# Patient Record
Sex: FEMALE | Race: WHITE | NOT HISPANIC OR LATINO | Employment: UNEMPLOYED | URBAN - METROPOLITAN AREA
[De-identification: names, ages, dates, MRNs, and addresses within clinical notes are randomized per-mention and may not be internally consistent; named-entity substitution may affect disease eponyms.]

---

## 2017-06-07 RX ORDER — OMEPRAZOLE 20 MG/1
1 CAPSULE, DELAYED RELEASE ORAL DAILY
COMMUNITY
End: 2017-06-07 | Stop reason: DRUGHIGH

## 2017-06-07 RX ORDER — OMEPRAZOLE 40 MG/1
40 CAPSULE, DELAYED RELEASE ORAL DAILY
COMMUNITY
End: 2017-06-08 | Stop reason: SDUPTHER

## 2017-06-08 ENCOUNTER — OFFICE VISIT (OUTPATIENT)
Dept: FAMILY MEDICINE | Facility: CLINIC | Age: 47
End: 2017-06-08
Payer: COMMERCIAL

## 2017-06-08 VITALS
WEIGHT: 137 LBS | DIASTOLIC BLOOD PRESSURE: 62 MMHG | HEART RATE: 79 BPM | BODY MASS INDEX: 23.39 KG/M2 | OXYGEN SATURATION: 99 % | HEIGHT: 64 IN | SYSTOLIC BLOOD PRESSURE: 116 MMHG

## 2017-06-08 DIAGNOSIS — K31.89 EROSIVE GASTROPATHY: ICD-10-CM

## 2017-06-08 DIAGNOSIS — B37.81 CANDIDAL ESOPHAGITIS: ICD-10-CM

## 2017-06-08 DIAGNOSIS — R10.9 ACUTE ABDOMINAL PAIN: Primary | ICD-10-CM

## 2017-06-08 PROBLEM — Z86.39 HISTORY OF THYROID DISORDER: Status: ACTIVE | Noted: 2017-06-08

## 2017-06-08 PROBLEM — R31.9 BLOOD IN THE URINE: Status: ACTIVE | Noted: 2017-06-08

## 2017-06-08 PROBLEM — Z86.39 HISTORY OF THYROID DISORDER: Status: RESOLVED | Noted: 2017-06-08 | Resolved: 2017-06-08

## 2017-06-08 PROBLEM — K21.00 GASTROESOPHAGEAL REFLUX DISEASE WITH ESOPHAGITIS: Status: ACTIVE | Noted: 2017-06-08

## 2017-06-08 PROBLEM — D50.9 IRON DEFICIENCY ANEMIA: Status: RESOLVED | Noted: 2017-06-08 | Resolved: 2017-06-08

## 2017-06-08 PROBLEM — D50.9 IRON DEFICIENCY ANEMIA: Status: ACTIVE | Noted: 2017-06-08

## 2017-06-08 PROBLEM — R10.2 PAIN IN FEMALE PELVIS: Status: ACTIVE | Noted: 2017-06-08

## 2017-06-08 PROBLEM — E89.0 POSTOPERATIVE HYPOTHYROIDISM: Status: ACTIVE | Noted: 2017-06-08

## 2017-06-08 PROCEDURE — 99214 OFFICE O/P EST MOD 30 MIN: CPT | Mod: ,,, | Performed by: FAMILY MEDICINE

## 2017-06-08 RX ORDER — OMEPRAZOLE 40 MG/1
40 CAPSULE, DELAYED RELEASE ORAL DAILY
Qty: 90 CAPSULE | Refills: 1 | Status: SHIPPED | OUTPATIENT
Start: 2017-06-08 | End: 2018-07-11

## 2017-06-08 RX ORDER — FLUCONAZOLE 100 MG/1
100 TABLET ORAL DAILY
Qty: 7 TABLET | Refills: 0 | Status: SHIPPED | OUTPATIENT
Start: 2017-06-08 | End: 2017-06-15

## 2017-06-08 NOTE — PROGRESS NOTES
Subjective:       Patient ID:  Lizbeth Pathak is a 46 y.o. female with multiple medical diagnoses as listed in the medical history and problem list that presents for Abdominal Pain (f/u)  .      Chief Complaint: Abdominal Pain (f/u)    Abdominal Pain   This is a chronic problem. The current episode started more than 1 month ago. The problem occurs intermittently. The problem has been gradually improving. The pain is located in the epigastric region. The pain is at a severity of 4/10. The pain is moderate. The quality of the pain is dull and cramping. The abdominal pain does not radiate. Pertinent negatives include no fever, nausea or vomiting. Arthralgias: right keen and back pain.  The pain is aggravated by eating. She has tried proton pump inhibitors for the symptoms. The treatment provided mild relief. Prior diagnostic workup includes GI consult, upper endoscopy and lower endoscopy. Her past medical history is significant for GERD.   Gastroesophageal Reflux   She complains of abdominal pain. She reports no chest pain or no nausea. This is a chronic problem. The current episode started more than 1 month ago. The problem occurs constantly. The problem has been gradually improving. The symptoms are aggravated by certain foods. Pertinent negatives include no anemia or muscle weakness. She has tried a PPI for the symptoms. The treatment provided mild relief. Past procedures include an EGD and a UGI.     Review of Systems   Constitutional: Negative for activity change, appetite change and fever.   HENT: Negative.  Negative for facial swelling.    Eyes: Negative.  Negative for discharge.   Respiratory: Negative for apnea.    Cardiovascular: Negative.  Negative for chest pain.   Gastrointestinal: Positive for abdominal pain. Negative for abdominal distention, nausea and vomiting.   Genitourinary: Negative for difficulty urinating and dyspareunia.   Musculoskeletal: Negative for muscle weakness. Arthralgias: right keen and  back pain.    Neurological: Negative.  Negative for dizziness.   Psychiatric/Behavioral: Negative for agitation.       Past Medical History:   Diagnosis Date    Anemia     GERD (gastroesophageal reflux disease)     Hypothyroidism     Thyroid disease     thyroid nodule     Past Surgical History:   Procedure Laterality Date    THYROID SURGERY      THYROIDECTOMY  1982     No family history on file.  Social History     Social History    Marital status:      Spouse name: N/A    Number of children: N/A    Years of education: N/A     Social History Main Topics    Smoking status: Never Smoker    Smokeless tobacco: None    Alcohol use Yes      Comment: occasional    Drug use: No    Sexual activity: Not Asked     Other Topics Concern    None     Social History Narrative    None     Current Outpatient Prescriptions   Medication Sig Dispense Refill    omeprazole (PRILOSEC) 40 MG capsule Take 1 capsule (40 mg total) by mouth once daily. 90 capsule 1    fluconazole (DIFLUCAN) 100 MG tablet Take 1 tablet (100 mg total) by mouth once daily. 7 tablet 0     No current facility-administered medications for this visit.      Review of patient's allergies indicates:  No Known Allergies  Objective:      Vitals:    06/08/17 1001   BP: 116/62   Pulse: 79     Physical Exam   Constitutional: She appears well-developed and well-nourished.   HENT:   Head: Normocephalic.   Eyes: Pupils are equal, round, and reactive to light. Right eye exhibits no discharge. Left eye exhibits no discharge. No scleral icterus.   Neck: No thyromegaly present.   Cardiovascular: Normal rate and regular rhythm.  Exam reveals no gallop and no friction rub.    No murmur heard.  Pulmonary/Chest: No respiratory distress. She has no wheezes. She has no rales. She exhibits no tenderness.   Abdominal: She exhibits no distension. There is tenderness in the epigastric area.   Musculoskeletal: She exhibits no tenderness.   Skin: No erythema.    Psychiatric: She has a normal mood and affect.       Assessment:       1. Acute abdominal pain    2. Erosive gastropathy    3. Candidal esophagitis        Plan:       Acute abdominal pain  Comments:  2/2 erosive gastritis    Erosive gastropathy  Comments:  mild improving. continue on PPI, consider redo-EGD / u/s if worsening  Orders:  -     omeprazole (PRILOSEC) 40 MG capsule; Take 1 capsule (40 mg total) by mouth once daily.  Dispense: 90 capsule; Refill: 1    Candidal esophagitis  Comments:  retreat.   Orders:  -     fluconazole (DIFLUCAN) 100 MG tablet; Take 1 tablet (100 mg total) by mouth once daily.  Dispense: 7 tablet; Refill: 0      Return in about 3 months (around 9/8/2017) for gerd .      6/8/2017 Morenita Duff M.D.

## 2017-06-08 NOTE — PATIENT INSTRUCTIONS
Tips to Control Acid Reflux    To control acid reflux, youll need to make some basic diet and lifestyle changes. The simple steps outlined below may be all youll need to ease discomfort.  Watch what you eat  · Avoid fatty foods and spicy foods.  · Eat fewer acidic foods, such as citrus and tomato-based foods. These can increase symptoms.  · Limit drinking alcohol, caffeine, and fizzy beverages. All increase acid reflux.  · Try limiting chocolate, peppermint, and spearmint. These can worsen acid reflux in some people.  Watch when you eat  · Avoid lying down for 3 hours after eating.  · Do not snack before going to bed.  Raise your head  Raising your head and upper body by 4 to 6 inches helps limit reflux when youre lying down. Put blocks under the head of your bed frame to raise it.  Other changes  · Lose weight, if you need to  · Dont exercise near bedtime  · Avoid tight-fitting clothes  · Limit aspirin and ibuprofen  · Stop smoking   Date Last Reviewed: 7/1/2016 © 2000-2016 [employer]. [address] [place] [address]. All rights reserved. This information is not intended as a substitute for professional medical care. Always follow your healthcare professional's instructions.        Medicines for Acid Reflux  Your healthcare provider has told you that you have acid reflux. This condition causes stomach acid to wash up into your throat. For most people, acid reflux is troubling but not dangerous. But left untreated, acid reflux sometimes damages the esophagus. Medicines can help control acid reflux and limit your risk of future problems.  Medicines for acid reflux  Your healthcare provider may prescribe medicine to help treat your acid reflux. Medicine will be based on your symptoms and any test results. Your provider will explain how to take your medicine. You will also be told about possible side effects.  Reducing stomach acid  Your provider may suggest antacids that you can buy  over the counter. Antacids can give fast relief. Or you may be told to take a type of medicine called H2 blockers. These are available over the counter and by prescription (for higher doses).  Blocking stomach acid  In more severe cases, your healthcare provider may suggest stronger medicines such as proton pump inhibitors (PPIs). These keep the stomach from making acid. They are often prescribed for long-term use.  Other medicines  In some cases medicines to reduce or block stomach acid may not work. Then you may be switched to another type of medicine that helps your stomach empty better.     Date Last Reviewed: 10/1/2016  © 3541-6239 [employer]. [address] [place] [address], [place] [address]. All rights reserved. This information is not intended as a substitute for professional medical care. Always follow your healthcare professional's instructions.

## 2018-07-11 PROBLEM — R31.9 BLOOD IN THE URINE: Status: RESOLVED | Noted: 2017-06-08 | Resolved: 2018-07-11

## 2018-07-11 PROBLEM — R10.2 PAIN IN FEMALE PELVIS: Status: RESOLVED | Noted: 2017-06-08 | Resolved: 2018-07-11

## 2018-07-11 PROBLEM — R10.9 ACUTE ABDOMINAL PAIN: Chronic | Status: RESOLVED | Noted: 2017-06-08 | Resolved: 2018-07-11

## 2019-09-11 PROBLEM — K31.89 EROSIVE GASTROPATHY: Status: RESOLVED | Noted: 2017-06-08 | Resolved: 2019-09-11

## 2020-06-09 DIAGNOSIS — Z12.31 ENCOUNTER FOR SCREENING MAMMOGRAM FOR MALIGNANT NEOPLASM OF BREAST: Primary | ICD-10-CM

## 2020-06-16 ENCOUNTER — HOSPITAL ENCOUNTER (OUTPATIENT)
Dept: RADIOLOGY | Facility: HOSPITAL | Age: 50
Discharge: HOME | End: 2020-06-16
Attending: OBSTETRICS & GYNECOLOGY
Payer: COMMERCIAL

## 2020-06-16 VITALS — HEIGHT: 64 IN | WEIGHT: 148.38 LBS | BODY MASS INDEX: 25.33 KG/M2

## 2020-06-16 DIAGNOSIS — Z12.31 ENCOUNTER FOR SCREENING MAMMOGRAM FOR MALIGNANT NEOPLASM OF BREAST: ICD-10-CM

## 2020-06-16 PROCEDURE — 77067 SCR MAMMO BI INCL CAD: CPT | Mod: TC,PO

## 2020-09-17 PROBLEM — K21.00 GASTROESOPHAGEAL REFLUX DISEASE WITH ESOPHAGITIS: Status: RESOLVED | Noted: 2017-06-08 | Resolved: 2020-09-17

## 2021-04-29 ENCOUNTER — PATIENT MESSAGE (OUTPATIENT)
Dept: RESEARCH | Facility: HOSPITAL | Age: 51
End: 2021-04-29

## 2021-06-30 DIAGNOSIS — Z12.31 ENCOUNTER FOR SCREENING MAMMOGRAM FOR MALIGNANT NEOPLASM OF BREAST: Primary | ICD-10-CM

## 2021-07-14 ENCOUNTER — HOSPITAL ENCOUNTER (OUTPATIENT)
Dept: RADIOLOGY | Facility: HOSPITAL | Age: 51
Discharge: HOME | End: 2021-07-14
Attending: OBSTETRICS & GYNECOLOGY
Payer: COMMERCIAL

## 2021-07-14 DIAGNOSIS — Z12.31 ENCOUNTER FOR SCREENING MAMMOGRAM FOR MALIGNANT NEOPLASM OF BREAST: ICD-10-CM

## 2021-07-14 PROCEDURE — 77067 SCR MAMMO BI INCL CAD: CPT | Mod: TC,PO

## 2022-07-18 DIAGNOSIS — Z12.31 ENCOUNTER FOR SCREENING MAMMOGRAM FOR MALIGNANT NEOPLASM OF BREAST: Primary | ICD-10-CM

## 2022-08-15 ENCOUNTER — HOSPITAL ENCOUNTER (OUTPATIENT)
Dept: RADIOLOGY | Facility: HOSPITAL | Age: 52
Discharge: HOME | End: 2022-08-15
Attending: OBSTETRICS & GYNECOLOGY
Payer: COMMERCIAL

## 2022-08-15 VITALS — WEIGHT: 151.88 LBS | BODY MASS INDEX: 25.93 KG/M2 | HEIGHT: 64 IN

## 2022-08-15 DIAGNOSIS — Z12.31 ENCOUNTER FOR SCREENING MAMMOGRAM FOR MALIGNANT NEOPLASM OF BREAST: ICD-10-CM

## 2022-08-15 PROCEDURE — 77067 SCR MAMMO BI INCL CAD: CPT | Mod: TC,PO

## 2022-08-15 PROCEDURE — 77063 BREAST TOMOSYNTHESIS BI: CPT | Mod: TC,PO

## 2023-06-11 ENCOUNTER — HOSPITAL ENCOUNTER (EMERGENCY)
Facility: HOSPITAL | Age: 53
Discharge: HOME | End: 2023-06-11
Attending: EMERGENCY MEDICINE
Payer: COMMERCIAL

## 2023-06-11 VITALS
SYSTOLIC BLOOD PRESSURE: 131 MMHG | TEMPERATURE: 98 F | OXYGEN SATURATION: 99 % | RESPIRATION RATE: 18 BRPM | DIASTOLIC BLOOD PRESSURE: 83 MMHG | HEART RATE: 68 BPM

## 2023-06-11 DIAGNOSIS — R07.9 CHEST PAIN: ICD-10-CM

## 2023-06-11 LAB
ALBUMIN SERPL BCP-MCNC: 4.6 G/DL (ref 3.5–5.2)
ALP SERPL-CCNC: 84 U/L (ref 55–135)
ALT SERPL W/O P-5'-P-CCNC: 21 U/L (ref 10–44)
ANION GAP SERPL CALC-SCNC: 14 MMOL/L (ref 8–16)
AST SERPL-CCNC: 16 U/L (ref 10–40)
BASOPHILS # BLD AUTO: 0.04 K/UL (ref 0–0.2)
BASOPHILS NFR BLD: 0.7 % (ref 0–1.9)
BILIRUB SERPL-MCNC: 0.7 MG/DL (ref 0.1–1)
BNP SERPL-MCNC: 21 PG/ML (ref 0–99)
BUN SERPL-MCNC: 9 MG/DL (ref 6–20)
CALCIUM SERPL-MCNC: 10.1 MG/DL (ref 8.7–10.5)
CHLORIDE SERPL-SCNC: 99 MMOL/L (ref 95–110)
CO2 SERPL-SCNC: 23 MMOL/L (ref 23–29)
CREAT SERPL-MCNC: 0.8 MG/DL (ref 0.5–1.4)
D DIMER PPP IA.FEU-MCNC: 0.27 MG/L FEU
DIFFERENTIAL METHOD: ABNORMAL
EOSINOPHIL # BLD AUTO: 0.1 K/UL (ref 0–0.5)
EOSINOPHIL NFR BLD: 0.8 % (ref 0–8)
ERYTHROCYTE [DISTWIDTH] IN BLOOD BY AUTOMATED COUNT: 12.5 % (ref 11.5–14.5)
EST. GFR  (NO RACE VARIABLE): >60 ML/MIN/1.73 M^2
GLUCOSE SERPL-MCNC: 96 MG/DL (ref 70–110)
HCT VFR BLD AUTO: 40.3 % (ref 37–48.5)
HGB BLD-MCNC: 13.3 G/DL (ref 12–16)
IMM GRANULOCYTES # BLD AUTO: 0.04 K/UL (ref 0–0.04)
IMM GRANULOCYTES NFR BLD AUTO: 0.7 % (ref 0–0.5)
LIPASE SERPL-CCNC: 24 U/L (ref 4–60)
LYMPHOCYTES # BLD AUTO: 1.7 K/UL (ref 1–4.8)
LYMPHOCYTES NFR BLD: 28.4 % (ref 18–48)
MCH RBC QN AUTO: 28.9 PG (ref 27–31)
MCHC RBC AUTO-ENTMCNC: 33 G/DL (ref 32–36)
MCV RBC AUTO: 87 FL (ref 82–98)
MONOCYTES # BLD AUTO: 0.3 K/UL (ref 0.3–1)
MONOCYTES NFR BLD: 5 % (ref 4–15)
NEUTROPHILS # BLD AUTO: 3.9 K/UL (ref 1.8–7.7)
NEUTROPHILS NFR BLD: 64.4 % (ref 38–73)
NRBC BLD-RTO: 0 /100 WBC
PLATELET # BLD AUTO: 248 K/UL (ref 150–450)
PMV BLD AUTO: 11.1 FL (ref 9.2–12.9)
POTASSIUM SERPL-SCNC: 3.6 MMOL/L (ref 3.5–5.1)
PROT SERPL-MCNC: 8.2 G/DL (ref 6–8.4)
RBC # BLD AUTO: 4.61 M/UL (ref 4–5.4)
SODIUM SERPL-SCNC: 136 MMOL/L (ref 136–145)
TROPONIN I SERPL DL<=0.01 NG/ML-MCNC: <0.006 NG/ML (ref 0–0.03)
TROPONIN I SERPL DL<=0.01 NG/ML-MCNC: <0.006 NG/ML (ref 0–0.03)
TSH SERPL DL<=0.005 MIU/L-ACNC: 1.41 UIU/ML (ref 0.4–4)
WBC # BLD AUTO: 6.06 K/UL (ref 3.9–12.7)

## 2023-06-11 PROCEDURE — 36415 COLL VENOUS BLD VENIPUNCTURE: CPT | Performed by: NURSE PRACTITIONER

## 2023-06-11 PROCEDURE — 94760 N-INVAS EAR/PLS OXIMETRY 1: CPT

## 2023-06-11 PROCEDURE — 85025 COMPLETE CBC W/AUTO DIFF WBC: CPT | Performed by: NURSE PRACTITIONER

## 2023-06-11 PROCEDURE — 84484 ASSAY OF TROPONIN QUANT: CPT | Mod: 91 | Performed by: NURSE PRACTITIONER

## 2023-06-11 PROCEDURE — 83880 ASSAY OF NATRIURETIC PEPTIDE: CPT | Performed by: NURSE PRACTITIONER

## 2023-06-11 PROCEDURE — 25000242 PHARM REV CODE 250 ALT 637 W/ HCPCS: Performed by: EMERGENCY MEDICINE

## 2023-06-11 PROCEDURE — 83690 ASSAY OF LIPASE: CPT | Performed by: EMERGENCY MEDICINE

## 2023-06-11 PROCEDURE — 99285 EMERGENCY DEPT VISIT HI MDM: CPT | Mod: 25

## 2023-06-11 PROCEDURE — 25000003 PHARM REV CODE 250: Performed by: EMERGENCY MEDICINE

## 2023-06-11 PROCEDURE — 85379 FIBRIN DEGRADATION QUANT: CPT | Performed by: EMERGENCY MEDICINE

## 2023-06-11 PROCEDURE — 93010 EKG 12-LEAD: ICD-10-PCS | Mod: ,,, | Performed by: INTERNAL MEDICINE

## 2023-06-11 PROCEDURE — 93005 ELECTROCARDIOGRAM TRACING: CPT

## 2023-06-11 PROCEDURE — 25000003 PHARM REV CODE 250: Performed by: NURSE PRACTITIONER

## 2023-06-11 PROCEDURE — 93010 ELECTROCARDIOGRAM REPORT: CPT | Mod: ,,, | Performed by: INTERNAL MEDICINE

## 2023-06-11 PROCEDURE — 84443 ASSAY THYROID STIM HORMONE: CPT | Performed by: NURSE PRACTITIONER

## 2023-06-11 PROCEDURE — 80053 COMPREHEN METABOLIC PANEL: CPT | Performed by: NURSE PRACTITIONER

## 2023-06-11 RX ORDER — ACETAMINOPHEN 500 MG
1000 TABLET ORAL
Status: COMPLETED | OUTPATIENT
Start: 2023-06-11 | End: 2023-06-11

## 2023-06-11 RX ORDER — NITROGLYCERIN 0.4 MG/1
0.4 TABLET SUBLINGUAL
Status: COMPLETED | OUTPATIENT
Start: 2023-06-11 | End: 2023-06-11

## 2023-06-11 RX ORDER — ASPIRIN 325 MG
325 TABLET ORAL
Status: COMPLETED | OUTPATIENT
Start: 2023-06-11 | End: 2023-06-11

## 2023-06-11 RX ADMIN — NITROGLYCERIN 0.4 MG: 0.4 TABLET, ORALLY DISINTEGRATING SUBLINGUAL at 01:06

## 2023-06-11 RX ADMIN — ASPIRIN 325 MG: 325 TABLET ORAL at 01:06

## 2023-06-11 RX ADMIN — ACETAMINOPHEN 1000 MG: 500 TABLET ORAL at 03:06

## 2023-06-11 NOTE — FIRST PROVIDER EVALUATION
" Emergency Department TeleTriage Encounter Note      CHIEF COMPLAINT    Chief Complaint   Patient presents with    Chest Pain     Pt states she feels a pressure in her chest.      Dizziness       VITAL SIGNS   Initial Vitals [06/11/23 1237]   BP Pulse Resp Temp SpO2   (!) 212/101 103 (!) 22 97.8 °F (36.6 °C) 99 %      MAP       --            ALLERGIES    Review of patient's allergies indicates:   Allergen Reactions    Nsaids (non-steroidal anti-inflammatory drug) Other (See Comments)     gastritis       PROVIDER TRIAGE NOTE  This is a teletriage evaluation of a 52 y.o. female presenting to the ED complaining of "lightheadedness" starting last night with CP and SOB starting today. No pmhx of HTN. Pt's spouse states that he gave her one lisinopril prior to arrival today due to noted elevated BP at home.  No headache.      Pt is sitting upright, eyes closed.  BP elevated. Comfortable work of breathing.      Attendings Anna and charge RN notified via [employer] of patient's status and BP    Initial orders will be placed and care will be transferred to an alternate provider when patient is roomed for a full evaluation. Any additional orders and the final disposition will be determined by that provider.         ORDERS  Labs Reviewed - No data to display    ED Orders (720h ago, onward)      Start Ordered     Status Ordering Provider    06/11/23 1547 06/11/23 1247  Troponin I #2  Once Timed         Ordered LISA BRAVO N.    06/11/23 1300 06/11/23 1247  aspirin tablet 325 mg  ED 1 Time         Ordered LISA BRAVO N.    06/11/23 1247 06/11/23 1247  Vital signs  Every 15 min         Ordered LISA BRAVO N.    06/11/23 1247 06/11/23 1247  Cardiac Monitoring - Adult  Continuous        Comments: Notify Physician If:    Ordered LISA BRAVO N.    06/11/23 1247 06/11/23 1247  Pulse Oximetry Continuous  Continuous         Ordered LISA BRAVO N.    06/11/23 1247 " 06/11/23 1247  Diet NPO  Diet effective now         Ordered LISA BRAVO N.    06/11/23 1247 06/11/23 1247  Saline lock IV  Once         Ordered LISA BRAVO N.    06/11/23 1247 06/11/23 1247  EKG 12-lead  Once        Comments: Do not perform if previously done during this visit/ triage    Ordered LISA BRAVO N.    06/11/23 1247 06/11/23 1247  CBC auto differential  STAT         Ordered LISA BRAVO N.    06/11/23 1247 06/11/23 1247  Comprehensive metabolic panel  STAT         Ordered LISA BRAVO N.    06/11/23 1247 06/11/23 1247  Troponin I #1  STAT         Ordered LISA BRAVO N.    06/11/23 1247 06/11/23 1247  BNP  STAT         Ordered LISA BRAVO N.    06/11/23 1247 06/11/23 1247  X-Ray Chest AP Portable  1 time imaging         Ordered LISA BRAVO N.    06/11/23 1247 06/11/23 1247  TSH  STAT         Ordered LISA BRAVO              Virtual Visit Note: The provider triage portion of this emergency department evaluation and documentation was performed via [employer], a HIPAA-compliant telemedicine application, in concert with a tele-presenter in the room. A face to face patient evaluation with one of my colleagues will occur once the patient is placed in an emergency department room.      DISCLAIMER: This note was prepared with [employer] voice recognition transcription software. Garbled syntax, mangled pronouns, and other bizarre constructions may be attributed to that software system.

## 2023-06-11 NOTE — ED PROVIDER NOTES
Encounter Date: 6/11/2023    SCRIBE #1 NOTE: I, Gissellegermaine Henry, am scribing for, and in the presence of,  Asim Thomason MD.     History     Chief Complaint   Patient presents with    Chest Pain     Pt states she feels a pressure in her chest.      Dizziness     Time seen by provider: 1:11 PM on 06/11/2023    Lizbeth Pathak is a 52 y.o. female who presents to the ED with an onset of chest pain, abdominal pain, headache, neck pain, back pain, nausea, dizziness, and SOB that began last night and worsened this morning. Patient was given a Lisinopril by her  to help with her blood pressure. The patient denies vomiting, vision changes or any other symptoms at this time. She has a PMHx of smoking, hypothyroidism, anemia. She has a PSHx of thyroidectomy. She has a family Hx of heart attack in a parent. Patient is a smoker.    The history is provided by the patient.   Review of patient's allergies indicates:   Allergen Reactions    Nsaids (non-steroidal anti-inflammatory drug) Other (See Comments)     gastritis     Past Medical History:   Diagnosis Date    Anal fissure 12/17/2012    Anemia     Gastritis due to fungus 2017    treated with Diflucan, Dr Becker    Hypothyroidism     Thyroid disease     thyroid nodule     Past Surgical History:   Procedure Laterality Date    THYROID SURGERY      THYROIDECTOMY  1982     Family History   Problem Relation Age of Onset    Diabetes Mother     Cancer Father      Social History     Tobacco Use    Smoking status: Never    Smokeless tobacco: Never   Substance Use Topics    Alcohol use: Yes     Comment: occasional    Drug use: No     Review of Systems   Eyes:  Negative for visual disturbance.   Respiratory:  Positive for shortness of breath.    Cardiovascular:  Positive for chest pain.   Gastrointestinal:  Positive for abdominal pain and nausea. Negative for vomiting.   Musculoskeletal:  Positive for back pain and neck pain.   Neurological:  Positive for dizziness and headaches.    All other systems reviewed and are negative.    Physical Exam     Initial Vitals [06/11/23 1237]   BP Pulse Resp Temp SpO2   (!) 212/101 103 (!) 22 97.8 °F (36.6 °C) 99 %      MAP       --         Physical Exam    Nursing note and vitals reviewed.  Constitutional: She appears well-developed and well-nourished. She is not diaphoretic.  Non-toxic appearance. She does not have a sickly appearance. She appears ill. She appears distressed (mildly).   HENT:   Head: Normocephalic and atraumatic.   Eyes: EOM are normal.   Neck: Neck supple.   Normal range of motion.  Cardiovascular:  Normal rate, regular rhythm and normal heart sounds.     Exam reveals no gallop and no friction rub.       No murmur heard.  Pulmonary/Chest: Breath sounds normal. No respiratory distress. She has no wheezes. She has no rhonchi. She has no rales.   Abdominal: She exhibits no distension. There is abdominal tenderness in the right upper quadrant. There is no rebound and no guarding.   Musculoskeletal:         General: Normal range of motion.      Cervical back: Normal range of motion and neck supple.     Neurological: She is alert and oriented to person, place, and time.   Skin: Skin is warm and dry.   Psychiatric: She has a normal mood and affect. Her behavior is normal. Judgment and thought content normal.       ED Course   Procedures  Labs Reviewed   CBC W/ AUTO DIFFERENTIAL - Abnormal; Notable for the following components:       Result Value    Immature Granulocytes 0.7 (*)     All other components within normal limits   COMPREHENSIVE METABOLIC PANEL   TROPONIN I   B-TYPE NATRIURETIC PEPTIDE   TSH   LIPASE   D DIMER, QUANTITATIVE   TROPONIN I          Imaging Results              US Abdomen Limited (Final result)  Result time 06/11/23 15:18:39      Final result by Shirin Polanco MD (06/11/23 15:18:39)                   Impression:      No significant sonographic abnormality.      Electronically signed by: Shirin Polanco  MD  Date:    06/11/2023  Time:    15:18               Narrative:    EXAMINATION:  US ABDOMEN LIMITED    CLINICAL HISTORY:  epigastric ruq pain;    TECHNIQUE:  Limited ultrasound of the right upper quadrant of the abdomen (including pancreas, liver, gallbladder, common bile duct, and spleen) was performed.    COMPARISON:  None.    FINDINGS:  Liver: Normal in size, measuring 16 cm. Homogeneous echotexture. No focal hepatic lesions.    Gallbladder: No calculi, wall thickening, or pericholecystic fluid.  No sonographic Mckeon's sign.    Biliary system: The common duct is not dilated, measuring 4.9 mm.  No intrahepatic ductal dilatation.    Spleen: Normal in size and echotexture, measuring 10.1 cm.    Miscellaneous: No upper abdominal ascites.                                       X-Ray Chest AP Portable (Final result)  Result time 06/11/23 13:15:03      Final result by Shirin Polanco MD (06/11/23 13:15:03)                   Impression:      No acute abnormality.      Electronically signed by: Shirin Polanco MD  Date:    06/11/2023  Time:    13:15               Narrative:    EXAMINATION:  XR CHEST AP PORTABLE    CLINICAL HISTORY:  Chest Pain;    TECHNIQUE:  Single frontal view of the chest was performed.    COMPARISON:  None    FINDINGS:  The lungs are clear, with normal appearance of pulmonary vasculature and no pleural effusion or pneumothorax.    The cardiac silhouette is normal in size. The hilar and mediastinal contours are unremarkable.    Bones are intact.                                       Medications   aspirin tablet 325 mg (325 mg Oral Given 6/11/23 1333)   nitroGLYCERIN SL tablet 0.4 mg (0.4 mg Sublingual Given 6/11/23 1345)   acetaminophen tablet 1,000 mg (1,000 mg Oral Given 6/11/23 1521)     Medical Decision Making:   History:   I obtained history from: someone other than patient.       <> Summary of History:   Old Medical Records: I decided to obtain old medical records.  Initial Assessment:    52-year-old female presents the ER with numerous complaints including lightheadedness, dizziness, headache, neck pain, chest pain, shortness of breath and abdominal pain  Differential Diagnosis:   PE, MI, dissection, CVA, electrolyte abnormality  Independently Interpreted Test(s):   I have ordered and independently interpreted X-rays - see prior notes.  I have ordered and independently interpreted EKG Reading(s) - see prior notes  Clinical Tests:   Lab Tests: Ordered and Reviewed  Radiological Study: Ordered and Reviewed  Medical Tests: Ordered and Reviewed  ED Management:  Patient was given aspirin, sublingual nitro, Tylenol and all symptoms resolved by discharge.  Extensive ER workup including D-dimer and serial enzymes are negative.  52-year-old female history of hypothyroidism presents to the emergency room with multiple complaints.  Symptoms began yesterday with headache.  Headache onset was gradual not sudden onset or maximal in onset.  Earlier today also began to have neck pain chest pain shortness of breath and abdominal pain.  Blood pressure markedly elevated at home prior to arrival so presented to the ER.  Symptoms were improving and she was given nitroglycerin and Tylenol which completely resolved her headache and other symptoms.  Two troponins are negative.  Normal D-dimer normal abdominal ultrasound all labs are unremarkable, normal EKG normal chest x-ray asymptomatic at this time.  Unclear etiology for symptoms but perhaps related to her elevated blood pressure.  Follow-up later this week with primary care, return to the ER for any other concerns.  Completely asymptomatic on discharge.  No evidence at all at this time of any life-threatening conditions such as pulmonary embolus, aortic dissection, MI, vertebral or carotid dissection.        Scribe Attestation:   Scribe #1: I performed the above scribed service and the documentation accurately describes the services I performed. I attest to the  accuracy of the note.      ED Course as of 06/11/23 1833   Sun Jun 11, 2023   1308 BP(!): 202/111 [EF]   1308 BP(!): 212/101 [EF]   1308 Temp: 97.8 °F (36.6 °C) [EF]   1308 Temp Source: Oral [EF]   1308 Pulse: 103 [EF]   1308 Resp(!): 22 [EF]   1308 SpO2: 99 % [EF]   1317 X-Ray Chest AP Portable [EF]   1318 Sinus rhythm 88 beats per minute normal axis no ST elevation or depression or T-wave inversion independently interpreted [EF]   1320 52-year-old relatively healthy lady presents to the emergency room with numerous complaints including headache neck pain chest pain shortness of breath abdominal pain.  Very broad differential including PE, MI, dissection [EF]   1320 We will get bilateral upper extremity blood pressures [EF]   1328 BUE BP same [EF]   1336 WBC: 6.06 [EF]   1336 Hemoglobin: 13.3 [EF]   1337 Platelets: 248 [EF]   1404 D-Dimer: 0.27 [EF]   1404 BP: 139/85 [EF]   1411 X-Ray Chest AP Portable [EF]   1426 Troponin I: <0.006 [EF]   1437 Sx improving, some epigastric abd pain, will US GB, mild HA.    I do not suspect a life-threatening cause of the headache such as subarachnoid, meningitis, epidural, subdural, venous thrombus, intracranial hypertension, carotid dissection or vertebral dissection.     [EF]   1437 Feels much better at time time [EF]   1522 US Abdomen Limited [EF]   1550 Continues to do well, HA almost gone [EF]   1635 Troponin I: <0.006 [EF]   1712 Sx resolved, will dc home [EF]   1717 52-year-old female history of hypothyroidism presents to the emergency room with multiple complaints.  Symptoms began yesterday with headache.  Headache onset was gradual not sudden onset or maximal in onset.  Earlier today also began to have neck pain chest pain shortness of breath and abdominal pain.  Blood pressure markedly elevated at home prior to arrival so presented to the ER.  Symptoms were improving and she was given nitroglycerin and Tylenol which completely resolved her headache and other symptoms.   Two troponins are negative.  Normal D-dimer normal abdominal ultrasound all labs are unremarkable, normal EKG normal chest x-ray asymptomatic at this time.  Unclear etiology for symptoms but perhaps related to her elevated blood pressure.  Follow-up later this week with primary care, return to the ER for any other concerns.  Completely asymptomatic on discharge. [EF]      ED Course User Index  [EF] Asim Thomason MD               I, Dr. Thomason, personally performed the services described in this documentation. All medical record entries made by the scribe were at my direction and in my presence.  I have reviewed the chart and agree that the record reflects my personal performance and is accurate and complete.6:33 PM 06/11/2023    Clinical Impression:   Final diagnoses:  [R07.9] Chest pain        ED Disposition Condition    Discharge Stable          ED Prescriptions    None       Follow-up Information       Follow up With Specialties Details Why Contact Info    Lili Garnica MD Hospitalist, Internal Medicine Schedule an appointment as soon as possible for a visit in 1 week  [address]  [facility] [address]  [facility] [address]  [phone]      [facility] Emergency Medicine  As needed, If symptoms worsen [address] [place] [id number]  [phone]             Asim Thomason MD  06/11/23 0943

## 2023-08-29 DIAGNOSIS — Z12.31 ENCOUNTER FOR SCREENING MAMMOGRAM FOR MALIGNANT NEOPLASM OF BREAST: Primary | ICD-10-CM

## 2023-09-01 ENCOUNTER — HOSPITAL ENCOUNTER (OUTPATIENT)
Dept: RADIOLOGY | Facility: HOSPITAL | Age: 53
Discharge: HOME | End: 2023-09-01
Attending: OBSTETRICS & GYNECOLOGY
Payer: COMMERCIAL

## 2023-09-01 DIAGNOSIS — Z12.31 ENCOUNTER FOR SCREENING MAMMOGRAM FOR MALIGNANT NEOPLASM OF BREAST: ICD-10-CM

## 2023-09-01 PROCEDURE — 77067 SCR MAMMO BI INCL CAD: CPT | Mod: TC,PO

## 2024-05-17 ENCOUNTER — OFFICE VISIT (OUTPATIENT)
Dept: URGENT CARE | Facility: CLINIC | Age: 54
End: 2024-05-17
Payer: COMMERCIAL

## 2024-05-17 VITALS
HEART RATE: 74 BPM | SYSTOLIC BLOOD PRESSURE: 163 MMHG | TEMPERATURE: 98 F | RESPIRATION RATE: 20 BRPM | WEIGHT: 147 LBS | BODY MASS INDEX: 25.1 KG/M2 | OXYGEN SATURATION: 97 % | DIASTOLIC BLOOD PRESSURE: 116 MMHG | HEIGHT: 64 IN

## 2024-05-17 DIAGNOSIS — M25.571 ACUTE BILATERAL ANKLE PAIN: ICD-10-CM

## 2024-05-17 DIAGNOSIS — M25.572 ACUTE BILATERAL ANKLE PAIN: ICD-10-CM

## 2024-05-17 DIAGNOSIS — M25.532 PAIN IN BOTH WRISTS: ICD-10-CM

## 2024-05-17 DIAGNOSIS — M25.531 PAIN IN BOTH WRISTS: ICD-10-CM

## 2024-05-17 DIAGNOSIS — H10.33 ACUTE CONJUNCTIVITIS OF BOTH EYES, UNSPECIFIED ACUTE CONJUNCTIVITIS TYPE: Primary | ICD-10-CM

## 2024-05-17 PROCEDURE — 96372 THER/PROPH/DIAG INJ SC/IM: CPT | Mod: S$GLB,,, | Performed by: EMERGENCY MEDICINE

## 2024-05-17 PROCEDURE — 99204 OFFICE O/P NEW MOD 45 MIN: CPT | Mod: 25,S$GLB,, | Performed by: NURSE PRACTITIONER

## 2024-05-17 RX ORDER — ATORVASTATIN CALCIUM 10 MG/1
10 TABLET, FILM COATED ORAL DAILY
COMMUNITY

## 2024-05-17 RX ORDER — OLOPATADINE HYDROCHLORIDE 1 MG/ML
1 SOLUTION/ DROPS OPHTHALMIC 2 TIMES DAILY
Qty: 5 ML | Refills: 0 | Status: SHIPPED | OUTPATIENT
Start: 2024-05-17 | End: 2024-05-24

## 2024-05-17 RX ORDER — DEXAMETHASONE SODIUM PHOSPHATE 4 MG/ML
8 INJECTION, SOLUTION INTRA-ARTICULAR; INTRALESIONAL; INTRAMUSCULAR; INTRAVENOUS; SOFT TISSUE
Status: COMPLETED | OUTPATIENT
Start: 2024-05-17 | End: 2024-05-17

## 2024-05-17 RX ADMIN — DEXAMETHASONE SODIUM PHOSPHATE 8 MG: 4 INJECTION, SOLUTION INTRA-ARTICULAR; INTRALESIONAL; INTRAMUSCULAR; INTRAVENOUS; SOFT TISSUE at 12:05

## 2024-05-17 NOTE — PROGRESS NOTES
"Subjective:      Patient ID: Lizbeth Pathak is a 53 y.o. female.    Vitals:  height is 5' 4" (1.626 m) and weight is 66.7 kg (147 lb). Her temperature is 98.2 °F (36.8 °C). Her blood pressure is 163/116 (abnormal) and her pulse is 74. Her respiration is 20 and oxygen saturation is 97%.     Chief Complaint: Leg Pain    53-year-old female seen today for bilateral ankle pain and swelling.  She also reports some numbness to her right ankle.  She denies previous injury.  She states the issue began approximately 1 week ago.  She denies any trauma but does state that she has recently worked in the garden and was squatting down but can not attribute the onset of these issues to correlate directly with working in the garden.  She also reports bilateral wrist pain that she noticed this morning along with waking up with bilateral red eyes and an irritated right eye.  She denies wrist injury or ever having this wrist pain before.  There has been no fever.  She denies a history of rheumatic disease.    Leg Pain   The incident occurred more than 1 week ago. There was no injury mechanism. The pain is present in the right ankle, left leg, left ankle and right leg. The pain is mild. The pain has been Constant since onset. Associated symptoms include an inability to bear weight, numbness and tingling. She reports no foreign bodies present. The symptoms are aggravated by movement and weight bearing. She has tried ice for the symptoms. The treatment provided no relief.       Constitution: Negative for chills and fever.   Neck: Negative for neck pain.   Cardiovascular:  Negative for chest pain, palpitations and sob on exertion.   Eyes:  Positive for eye pain (Burning) and eye redness. Negative for eye trauma, eye discharge, eye itching, photophobia, vision loss, double vision, blurred vision and eyelid swelling.   Respiratory:  Negative for cough and shortness of breath.    Gastrointestinal:  Negative for nausea and vomiting. "   Musculoskeletal:  Positive for pain, joint pain, joint swelling and muscle ache. Negative for trauma and abnormal ROM of joint.   Skin:  Negative for rash.   Neurological:  Positive for numbness. Negative for dizziness, light-headedness, passing out, headaches, disorientation and altered mental status.   Psychiatric/Behavioral:  Negative for altered mental status, disorientation and confusion.       Objective:     Physical Exam   Constitutional: She is oriented to person, place, and time. She appears well-developed. She is cooperative.  Non-toxic appearance. She does not appear ill. No distress.   HENT:   Head: Normocephalic and atraumatic.   Ears:   Right Ear: Hearing and external ear normal.   Left Ear: Hearing and external ear normal.   Nose: Nose normal. No mucosal edema, rhinorrhea, nasal deformity or congestion. No epistaxis. Right sinus exhibits no maxillary sinus tenderness and no frontal sinus tenderness. Left sinus exhibits no maxillary sinus tenderness and no frontal sinus tenderness.   Mouth/Throat: Uvula is midline, oropharynx is clear and moist and mucous membranes are normal. Mucous membranes are moist. No trismus in the jaw. Normal dentition. No uvula swelling. No oropharyngeal exudate, posterior oropharyngeal edema or posterior oropharyngeal erythema.   Eyes: Lids are normal. Pupils are equal, round, and reactive to light. Lids are everted and swept, no foreign bodies found. Right eye exhibits discharge (Watery). Right eye exhibits no chemosis, no exudate and no hordeolum. No foreign body present in the right eye. Left eye exhibits no chemosis, no discharge, no exudate and no hordeolum. No foreign body present in the left eye. Right conjunctiva is injected. Right conjunctiva has no hemorrhage. Left conjunctiva is not injected. Left conjunctiva has no hemorrhage. No scleral icterus. Extraocular movement intact vision grossly intact gaze aligned appropriately periorbital hyperpigmentation  Neck:  Trachea normal and phonation normal. Neck supple. No edema present. No erythema present. No neck rigidity present.   Cardiovascular: Normal rate, regular rhythm, normal heart sounds and normal pulses.   Pulses:       Radial pulses are 2+ on the right side and 2+ on the left side.        Dorsalis pedis pulses are 2+ on the right side and 2+ on the left side.   Pulmonary/Chest: Effort normal and breath sounds normal. No stridor. No respiratory distress. She has no decreased breath sounds. She has no rhonchi.   Abdominal: Normal appearance.   Musculoskeletal: Normal range of motion.         General: No deformity. Normal range of motion.      Right wrist: Normal.      Left wrist: Normal.      Right hand: She exhibits normal capillary refill. Motor /Testing: The patient has normal right wrist strength. Testing: no Tinel's sign at right wrist. Finkelstein's test: negative. Comments: Hand is pink warm and dry      Left hand: She exhibits normal capillary refill. Motor /Testing: The patient has normal left wrist strength. Testing: no Tinel's sign at left wrist. Finkelstein's test: negative. Comments: Hand is pink warm and dry        Hands:       Right lower leg: Normal. She exhibits no tenderness, no bony tenderness, no swelling, no deformity and no laceration. No edema.      Left lower leg: Normal. She exhibits no tenderness, no bony tenderness, no swelling, no deformity and no laceration. No edema.      Right foot: Normal range of motion. No deformity. No bruising, atrophy, scars or effusion present.      Left foot: Normal range of motion. No deformity. No bruising, atrophy, scars or effusion present.        Feet:       Comments: No posterior knee or calf pain.  No calf swelling.  [place] test negative bilaterally.   Neurological: no focal deficit. She is alert and oriented to person, place, and time. She has normal motor skills, normal sensation and intact cranial nerves (2-12). She exhibits normal muscle tone.  Coordination normal. Gait abnormal. Coordination normal. GCS eye subscore is 4. GCS verbal subscore is 5. GCS motor subscore is 6.   Skin: Skin is warm, dry, intact, not diaphoretic and not pale. Capillary refill takes 2 to 3 seconds. not left lower leg and not right lower leg  Psychiatric: Her speech is normal and behavior is normal. Judgment and thought content normal.   Nursing note and vitals reviewed.      Assessment:     1. Acute conjunctivitis of both eyes, unspecified acute conjunctivitis type    2. Acute bilateral ankle pain    3. Pain in both wrists        Plan:       Acute conjunctivitis of both eyes, unspecified acute conjunctivitis type  -     olopatadine (PATANOL) 0.1 % ophthalmic solution; Place 1 drop into both eyes 2 (two) times daily. for 7 days  Dispense: 5 mL; Refill: 0    Acute bilateral ankle pain  -     dexAMETHasone injection 8 mg    Pain in both wrists  -     dexAMETHasone injection 8 mg      The physical exam findings were discussed with the patient and her  and all questions answered.  Possible carpal tunnel syndrome of wrists and Achilles tendinitis of her ankles due to  squatting while working in the garden.  We discussed the possibility of new onset of rheumatic disease and the need for further evaluation by her PCP. We then discussed her blood pressure at great length and the need for monitoring her blood pressure and keeping a log over the next week until she is able to see Dr. Garnica next week. We finally discussed strict symptom monitoring with ER precautions for any worsening or new symptoms, conservative care methods, medication use, and follow up orders.  They verbalized understanding and agreement with the plan of care.

## 2024-05-17 NOTE — PATIENT INSTRUCTIONS
Use olopatadine eyedrops as prescribed.  You may also use moisturizing eyedrops available over-the-counter  Take Tylenol as needed for joint pain  You can apply ice or heating pad as needed to your joints for 15 minutes on and 2 hours off  Elevate your ankles at rest to help reduce swelling  If you develop worsening numbness, numbness that progresses to other parts your body, or a loss of function in any body part go directly to the emergency room  If you develop further eye pain, changes in vision, or other eye complaints go directly to the emergency room  Obtain an automatic blood pressure cuff from your pharmacy.  Take your blood pressure twice daily, preferably in the morning and evening.  Keep these readings in a log along with the time and date.  When you see your PCP at your next visit present this log to them to evaluate your need for blood pressure medication  Follow up with your PCP as soon as possible  Return to this clinic for any new needs

## 2024-05-20 ENCOUNTER — HOSPITAL ENCOUNTER (OUTPATIENT)
Dept: RADIOLOGY | Facility: HOSPITAL | Age: 54
Discharge: HOME | End: 2024-05-20
Attending: INTERNAL MEDICINE
Payer: COMMERCIAL

## 2024-05-20 DIAGNOSIS — R60.9 EDEMA, UNSPECIFIED: Primary | ICD-10-CM

## 2024-05-20 DIAGNOSIS — R60.9 EDEMA, UNSPECIFIED: ICD-10-CM

## 2024-05-20 PROCEDURE — 93971 EXTREMITY STUDY: CPT | Mod: 26,RT,, | Performed by: RADIOLOGY

## 2024-05-20 PROCEDURE — 93971 EXTREMITY STUDY: CPT | Mod: TC,RT

## 2024-07-22 DIAGNOSIS — F17.210 CIGARETTE SMOKER: Primary | ICD-10-CM

## 2024-07-25 ENCOUNTER — HOSPITAL ENCOUNTER (OUTPATIENT)
Dept: RADIOLOGY | Facility: HOSPITAL | Age: 54
Discharge: HOME | End: 2024-07-25
Attending: INTERNAL MEDICINE
Payer: COMMERCIAL

## 2024-07-25 DIAGNOSIS — F17.210 CIGARETTE SMOKER: ICD-10-CM

## 2024-07-25 PROCEDURE — 71271 CT THORAX LUNG CANCER SCR C-: CPT | Mod: 26,,, | Performed by: RADIOLOGY

## 2024-07-25 PROCEDURE — 71271 CT THORAX LUNG CANCER SCR C-: CPT | Mod: TC,PO

## 2024-10-18 ENCOUNTER — HOSPITAL ENCOUNTER (OUTPATIENT)
Dept: RADIOLOGY | Facility: HOSPITAL | Age: 54
Discharge: HOME | End: 2024-10-18
Attending: OBSTETRICS & GYNECOLOGY
Payer: COMMERCIAL

## 2024-10-18 DIAGNOSIS — Z12.31 ENCOUNTER FOR SCREENING MAMMOGRAM FOR MALIGNANT NEOPLASM OF BREAST: Primary | ICD-10-CM

## 2024-10-18 DIAGNOSIS — Z12.31 ENCOUNTER FOR SCREENING MAMMOGRAM FOR MALIGNANT NEOPLASM OF BREAST: ICD-10-CM

## 2024-10-18 PROCEDURE — 77063 BREAST TOMOSYNTHESIS BI: CPT | Mod: 26,,, | Performed by: RADIOLOGY

## 2024-10-18 PROCEDURE — 77067 SCR MAMMO BI INCL CAD: CPT | Mod: TC,PO

## 2024-10-18 PROCEDURE — 77067 SCR MAMMO BI INCL CAD: CPT | Mod: 26,,, | Performed by: RADIOLOGY
